# Patient Record
Sex: MALE | Race: WHITE | NOT HISPANIC OR LATINO
[De-identification: names, ages, dates, MRNs, and addresses within clinical notes are randomized per-mention and may not be internally consistent; named-entity substitution may affect disease eponyms.]

---

## 2018-09-21 ENCOUNTER — APPOINTMENT (OUTPATIENT)
Dept: FAMILY MEDICINE | Facility: CLINIC | Age: 23
End: 2018-09-21

## 2019-01-06 ENCOUNTER — APPOINTMENT (OUTPATIENT)
Dept: FAMILY MEDICINE | Facility: CLINIC | Age: 24
End: 2019-01-06
Payer: COMMERCIAL

## 2019-01-06 ENCOUNTER — TRANSCRIPTION ENCOUNTER (OUTPATIENT)
Age: 24
End: 2019-01-06

## 2019-01-06 VITALS
BODY MASS INDEX: 20.46 KG/M2 | OXYGEN SATURATION: 99 % | WEIGHT: 135 LBS | DIASTOLIC BLOOD PRESSURE: 70 MMHG | HEIGHT: 68 IN | HEART RATE: 77 BPM | SYSTOLIC BLOOD PRESSURE: 120 MMHG

## 2019-01-06 PROCEDURE — 99203 OFFICE O/P NEW LOW 30 MIN: CPT

## 2019-01-06 RX ORDER — CHLORHEXIDINE GLUCONATE, 0.12% ORAL RINSE 1.2 MG/ML
0.12 SOLUTION DENTAL
Qty: 473 | Refills: 0 | Status: COMPLETED | COMMUNITY
Start: 2018-12-11

## 2019-01-06 RX ORDER — DEXLANSOPRAZOLE 30 MG/1
30 CAPSULE, DELAYED RELEASE ORAL
Qty: 30 | Refills: 0 | Status: COMPLETED | COMMUNITY
Start: 2018-12-28

## 2019-01-06 RX ORDER — CEFUROXIME AXETIL 250 MG/1
250 TABLET ORAL
Qty: 20 | Refills: 0 | Status: COMPLETED | COMMUNITY
Start: 2018-11-12

## 2019-01-06 RX ORDER — PREDNISONE 20 MG/1
20 TABLET ORAL
Qty: 10 | Refills: 0 | Status: COMPLETED | COMMUNITY
Start: 2018-07-12

## 2019-01-06 RX ORDER — AMOXICILLIN 500 MG/1
500 CAPSULE ORAL
Qty: 20 | Refills: 0 | Status: COMPLETED | COMMUNITY
Start: 2018-09-20

## 2019-01-06 RX ORDER — OMEPRAZOLE 20 MG/1
20 CAPSULE, DELAYED RELEASE ORAL
Qty: 30 | Refills: 0 | Status: COMPLETED | COMMUNITY
Start: 2018-11-13

## 2019-01-06 RX ORDER — AMOXICILLIN AND CLAVULANATE POTASSIUM 875; 125 MG/1; MG/1
875-125 TABLET, COATED ORAL
Qty: 20 | Refills: 0 | Status: COMPLETED | COMMUNITY
Start: 2018-09-20

## 2019-01-13 ENCOUNTER — APPOINTMENT (OUTPATIENT)
Dept: FAMILY MEDICINE | Facility: CLINIC | Age: 24
End: 2019-01-13
Payer: COMMERCIAL

## 2019-01-13 VITALS
SYSTOLIC BLOOD PRESSURE: 110 MMHG | WEIGHT: 135 LBS | BODY MASS INDEX: 20.46 KG/M2 | DIASTOLIC BLOOD PRESSURE: 70 MMHG | HEIGHT: 68 IN

## 2019-01-13 PROCEDURE — 99213 OFFICE O/P EST LOW 20 MIN: CPT

## 2019-01-21 RX ORDER — DOXYCYCLINE 100 MG/1
100 CAPSULE ORAL
Qty: 14 | Refills: 0 | Status: DISCONTINUED | COMMUNITY
Start: 2019-01-20 | End: 2019-01-21

## 2020-09-06 ENCOUNTER — TRANSCRIPTION ENCOUNTER (OUTPATIENT)
Age: 25
End: 2020-09-06

## 2023-05-15 ENCOUNTER — NON-APPOINTMENT (OUTPATIENT)
Age: 28
End: 2023-05-15

## 2023-05-16 ENCOUNTER — APPOINTMENT (OUTPATIENT)
Dept: INTERNAL MEDICINE | Facility: CLINIC | Age: 28
End: 2023-05-16
Payer: COMMERCIAL

## 2023-05-16 ENCOUNTER — NON-APPOINTMENT (OUTPATIENT)
Age: 28
End: 2023-05-16

## 2023-05-16 ENCOUNTER — APPOINTMENT (OUTPATIENT)
Dept: INTERNAL MEDICINE | Facility: CLINIC | Age: 28
End: 2023-05-16

## 2023-05-16 VITALS
DIASTOLIC BLOOD PRESSURE: 77 MMHG | TEMPERATURE: 98 F | BODY MASS INDEX: 21.67 KG/M2 | WEIGHT: 143 LBS | OXYGEN SATURATION: 99 % | SYSTOLIC BLOOD PRESSURE: 123 MMHG | HEIGHT: 68 IN | HEART RATE: 77 BPM

## 2023-05-16 DIAGNOSIS — K58.9 IRRITABLE BOWEL SYNDROME W/OUT DIARRHEA: ICD-10-CM

## 2023-05-16 DIAGNOSIS — R35.0 FREQUENCY OF MICTURITION: ICD-10-CM

## 2023-05-16 DIAGNOSIS — Z00.00 ENCOUNTER FOR GENERAL ADULT MEDICAL EXAMINATION W/OUT ABNORMAL FINDINGS: ICD-10-CM

## 2023-05-16 DIAGNOSIS — J45.909 UNSPECIFIED ASTHMA, UNCOMPLICATED: ICD-10-CM

## 2023-05-16 DIAGNOSIS — H54.7 UNSPECIFIED VISUAL LOSS: ICD-10-CM

## 2023-05-16 DIAGNOSIS — G47.00 INSOMNIA, UNSPECIFIED: ICD-10-CM

## 2023-05-16 DIAGNOSIS — A04.6 ENTERITIS DUE TO YERSINIA ENTEROCOLITICA: ICD-10-CM

## 2023-05-16 DIAGNOSIS — Z82.49 FAMILY HISTORY OF ISCHEMIC HEART DISEASE AND OTHER DISEASES OF THE CIRCULATORY SYSTEM: ICD-10-CM

## 2023-05-16 DIAGNOSIS — F41.9 ANXIETY DISORDER, UNSPECIFIED: ICD-10-CM

## 2023-05-16 DIAGNOSIS — U07.1 COVID-19: ICD-10-CM

## 2023-05-16 DIAGNOSIS — Z83.438 FAMILY HISTORY OF OTHER DISORDER OF LIPOPROTEIN METABOLISM AND OTHER LIPIDEMIA: ICD-10-CM

## 2023-05-16 DIAGNOSIS — Z91.09 OTHER ALLERGY STATUS, OTHER THAN TO DRUGS AND BIOLOGICAL SUBSTANCES: ICD-10-CM

## 2023-05-16 DIAGNOSIS — K21.9 GASTRO-ESOPHAGEAL REFLUX DISEASE W/OUT ESOPHAGITIS: ICD-10-CM

## 2023-05-16 PROCEDURE — 99385 PREV VISIT NEW AGE 18-39: CPT

## 2023-05-16 RX ORDER — DM/PE/ACETAMINOPHEN/CHLORPHENR 10-5-325-2
100 TABLET, SEQUENTIAL ORAL DAILY
Refills: 0 | Status: ACTIVE | COMMUNITY
Start: 2019-01-06

## 2023-05-16 RX ORDER — ZOLPIDEM TARTRATE 5 MG/1
5 TABLET ORAL
Qty: 30 | Refills: 0 | Status: DISCONTINUED | COMMUNITY
Start: 2018-10-31 | End: 2023-05-16

## 2023-05-16 RX ORDER — AMOXICILLIN AND CLAVULANATE POTASSIUM 875; 125 MG/1; MG/1
875-125 TABLET, COATED ORAL
Qty: 14 | Refills: 0 | Status: DISCONTINUED | COMMUNITY
Start: 2019-01-06 | End: 2023-05-16

## 2023-05-16 RX ORDER — SUCRALFATE 1 G/10ML
1 SUSPENSION ORAL
Qty: 1 | Refills: 1 | Status: ACTIVE | COMMUNITY
Start: 1900-01-01 | End: 1900-01-01

## 2023-05-16 RX ORDER — IPRATROPIUM BROMIDE 42 UG/1
0.06 SPRAY NASAL 3 TIMES DAILY
Qty: 1 | Refills: 0 | Status: DISCONTINUED | COMMUNITY
Start: 2019-01-20 | End: 2023-05-16

## 2023-05-16 RX ORDER — MULTIVITAMIN
TABLET ORAL
Refills: 0 | Status: ACTIVE | COMMUNITY

## 2023-05-16 RX ORDER — PEPPERMINT OIL 90 MG
90 CAPSULE, DELAYED, AND EXTENDED RELEASE ORAL
Refills: 0 | Status: ACTIVE | COMMUNITY

## 2023-05-16 RX ORDER — CEFUROXIME AXETIL 500 MG/1
500 TABLET ORAL
Qty: 20 | Refills: 0 | Status: DISCONTINUED | COMMUNITY
Start: 2019-01-21 | End: 2023-05-16

## 2023-05-16 RX ORDER — FAMOTIDINE 20 MG/1
20 TABLET, FILM COATED ORAL
Refills: 0 | Status: ACTIVE | COMMUNITY

## 2023-05-16 RX ORDER — CAMPHOR 0.45 %
25 GEL (GRAM) TOPICAL
Refills: 0 | Status: ACTIVE | COMMUNITY
Start: 2019-01-06

## 2023-05-16 RX ORDER — ECHINACEA 400 MG
CAPSULE ORAL
Refills: 0 | Status: ACTIVE | COMMUNITY

## 2023-05-16 RX ORDER — ASCORBIC ACID 500 MG
TABLET ORAL
Refills: 0 | Status: ACTIVE | COMMUNITY

## 2023-05-16 RX ORDER — PREDNISONE 20 MG/1
20 TABLET ORAL
Qty: 12 | Refills: 0 | Status: DISCONTINUED | COMMUNITY
Start: 2019-01-06 | End: 2023-05-16

## 2023-05-16 RX ORDER — RABEPRAZOLE SODIUM 20 MG/1
20 TABLET, DELAYED RELEASE ORAL
Qty: 30 | Refills: 0 | Status: DISCONTINUED | COMMUNITY
Start: 2018-12-18 | End: 2023-05-16

## 2023-05-16 RX ORDER — AZELASTINE HYDROCHLORIDE 137 UG/1
137 SPRAY, METERED NASAL
Qty: 60 | Refills: 0 | Status: ACTIVE | COMMUNITY
Start: 2017-08-22

## 2023-05-16 RX ORDER — MONTELUKAST 10 MG/1
10 TABLET, FILM COATED ORAL DAILY
Qty: 30 | Refills: 0 | Status: DISCONTINUED | COMMUNITY
Start: 2019-01-13 | End: 2023-05-16

## 2023-05-30 ENCOUNTER — TRANSCRIPTION ENCOUNTER (OUTPATIENT)
Age: 28
End: 2023-05-30

## 2023-05-30 DIAGNOSIS — F13.20 SEDATIVE, HYPNOTIC OR ANXIOLYTIC DEPENDENCE, UNCOMPLICATED: ICD-10-CM

## 2023-06-09 ENCOUNTER — APPOINTMENT (OUTPATIENT)
Dept: ORTHOPEDIC SURGERY | Facility: CLINIC | Age: 28
End: 2023-06-09
Payer: COMMERCIAL

## 2023-06-09 PROCEDURE — 73010 X-RAY EXAM OF SHOULDER BLADE: CPT | Mod: LT

## 2023-06-09 PROCEDURE — 99214 OFFICE O/P EST MOD 30 MIN: CPT

## 2023-06-09 PROCEDURE — 73030 X-RAY EXAM OF SHOULDER: CPT | Mod: LT

## 2023-06-09 RX ORDER — METHYLPREDNISOLONE 4 MG/1
4 TABLET ORAL
Qty: 1 | Refills: 0 | Status: ACTIVE | COMMUNITY
Start: 2023-06-09 | End: 1900-01-01

## 2023-06-09 NOTE — CONSULT LETTER
[Dear  ___] : Dear  [unfilled], [Consult Letter:] : I had the pleasure of evaluating your patient, [unfilled]. [Please see my note below.] : Please see my note below. [Consult Closing:] : Thank you very much for allowing me to participate in the care of this patient.  If you have any questions, please do not hesitate to contact me. [Sincerely,] : Sincerely, [FreeTextEntry3] : Dr. Dmitriy Kelley\par Shoulder Surgery\par

## 2023-06-09 NOTE — ASSESSMENT
[FreeTextEntry1] : We discussed the underlying pathology. \par Treatment options reviewed. \par Start PT.\par Medrol is prescribed. \par Cautions discussed. Questions answered.\par RTO 3 weeks. \par \par The documentation recorded by the scribe accurately reflects the service I personally performed and the decisions made by me.\par Entered by King Chamorro acting as scribe.\par \par Patient seen by Dmitriy Kelley M.D.\par Dr. Dmitriy Kelley\par Shoulder Surgery\par

## 2023-06-09 NOTE — PHYSICAL EXAM
[Left] : left shoulder [Minimal] : minimal [5 ___] : forward flexion 5[unfilled]/5 [5___] : external rotation 5[unfilled]/5 [Right] : right shoulder [Sitting] : sitting [] : no sensory deficits [FreeTextEntry3] : Left trace scapular winging [FreeTextEntry8] : Tender along medial scapula border [FreeTextEntry9] : IR: T12 [TWNoteComboBox4] : passive forward flexion 165 degrees [de-identified] : external rotation 70 degrees

## 2023-06-09 NOTE — HISTORY OF PRESENT ILLNESS
[5] : 5 [1] : 2 [Rest] : rest [Meds] : meds [Ice] : ice [] : no [FreeTextEntry1] : left shoulder  [FreeTextEntry5] : pt has been having left shoulder pain for about a week now, noticed after going to the gym  [FreeTextEntry9] : advil and voltaren  [de-identified] : movement [de-identified] : his PCP

## 2023-06-09 NOTE — REASON FOR VISIT
[FreeTextEntry2] : This is a 28 year old RHD male  with left shoulder pain onset after gym on the 5/22/23. Denies trauma or history. Recalls working out with  and attributes pain to overuse. pushng and pull can be painful. Night symptoms can occur. There is no n/t. NSAID use as needed with temporary relief.\par

## 2023-06-19 ENCOUNTER — APPOINTMENT (OUTPATIENT)
Dept: ORTHOPEDIC SURGERY | Facility: CLINIC | Age: 28
End: 2023-06-19
Payer: COMMERCIAL

## 2023-06-19 PROCEDURE — 99214 OFFICE O/P EST MOD 30 MIN: CPT

## 2023-06-19 RX ORDER — CLONAZEPAM 0.5 MG/1
0.5 TABLET ORAL
Qty: 30 | Refills: 0 | Status: DISCONTINUED | COMMUNITY
Start: 2018-11-06 | End: 2023-01-02

## 2023-06-19 NOTE — HISTORY OF PRESENT ILLNESS
[3] : 3 [0] : 0 [de-identified] : pt is here today for a follow up for his left shoulder. pt states his pain has been improving since last visit  [FreeTextEntry1] : left shoulder  [de-identified] : physical therapy for a few sessions but was not helpful so he stopped\par medrol used and provided some relief

## 2023-06-19 NOTE — REASON FOR VISIT
[FreeTextEntry2] : This is a 28 year old RHD male  with left shoulder pain onset after gym on the 5/22/23. Denies trauma or history. Recalls working out with  and attributes pain to overuse. pushng and pull can be painful. Night symptoms can occur. There is no n/t. NSAID use as needed with temporary relief.  He is feeling better after the medrol, ~85%.  Reaching and lifting are less troublesome.  He is sleeping.  He did 2 PT visits, but stopped because it was more painful.

## 2023-06-19 NOTE — PHYSICAL EXAM
[Left] : left shoulder [Minimal] : minimal [5 ___] : forward flexion 5[unfilled]/5 [5___] : external rotation 5[unfilled]/5 [Right] : right shoulder [Sitting] : sitting [] : no sensory deficits [FreeTextEntry3] : Left trace scapular winging [FreeTextEntry8] : Tender along medial scapula border [FreeTextEntry9] : IR: T12 [TWNoteComboBox4] : passive forward flexion 165 degrees [de-identified] : external rotation 70 degrees

## 2023-06-19 NOTE — ASSESSMENT
[FreeTextEntry1] : We discussed his course. \par He is doing better.\par Aleve use discussed. \par He will follow up in 2 weeks. \par Questions answered.\par \par Patient was seen by Dr. Dmitriy Kelley.\par Patient was seen by Connie COOPER under the supervision of Dr. Dmitriy Kelley.\par Progress note was completed by Connie COOPER.\par Entered by Una Carvajal acting as scribe.

## 2023-07-05 ENCOUNTER — APPOINTMENT (OUTPATIENT)
Dept: ORTHOPEDIC SURGERY | Facility: CLINIC | Age: 28
End: 2023-07-05
Payer: COMMERCIAL

## 2023-07-05 VITALS — WEIGHT: 145 LBS | BODY MASS INDEX: 21.98 KG/M2 | HEIGHT: 68 IN

## 2023-07-05 PROCEDURE — 99214 OFFICE O/P EST MOD 30 MIN: CPT

## 2023-07-05 RX ORDER — METHYLPREDNISOLONE 4 MG/1
4 TABLET ORAL
Qty: 1 | Refills: 0 | Status: ACTIVE | COMMUNITY
Start: 2023-07-05 | End: 1900-01-01

## 2023-07-05 NOTE — REASON FOR VISIT
[FreeTextEntry2] : This is a 28 year old RHD male  with left shoulder pain onset after gym on the 5/22/23. Denies trauma or history. Recalls working out with  and attributes pain to overuse.  There is no n/t. NSAID use as needed with temporary relief.  He is feeling better after the medrol, ~85%.  Reaching and lifting are less troublesome.  He is sleeping.

## 2023-07-05 NOTE — HISTORY OF PRESENT ILLNESS
[3] : 3 [Dull/Aching] : dull/aching [Intermittent] : intermittent [Ice] : ice [Exercising] : exercising [Full time] : Work status: full time [de-identified] : Patient is here for a follow up on his left shoulder. [] : no [FreeTextEntry1] : Left shoulder [de-identified] : p [de-identified] : HEP

## 2023-07-05 NOTE — ASSESSMENT
[FreeTextEntry1] : We discussed his course. \par He has made nice gains. \par He will continue HEP. \par Because there is still discomfort MDP is prescribed. \par Their questions were answered. \par Follow up will be at his request. \par \par Patient was seen by Dr. Dmitriy Kelley.\par Patient was seen by Connie COOPER under the supervision of Dr. Dmitriy Kelley.\par Progress note was completed by Connie COOPER.\par Entered by Una Carvajal acting as scribe.

## 2023-07-05 NOTE — PHYSICAL EXAM
[Left] : left shoulder [Minimal] : minimal [5 ___] : forward flexion 5[unfilled]/5 [5___] : external rotation 5[unfilled]/5 [Right] : right shoulder [Sitting] : sitting [] : no sensory deficits [FreeTextEntry3] : Left trace scapular winging [FreeTextEntry8] : Tender along medial scapula border [FreeTextEntry9] : IR: T12 [TWNoteComboBox4] : passive forward flexion 165 degrees [de-identified] : external rotation 70 degrees

## 2023-10-29 ENCOUNTER — RX RENEWAL (OUTPATIENT)
Age: 28
End: 2023-10-29

## 2023-10-29 RX ORDER — FLUTICASONE PROPIONATE 50 UG/1
50 SPRAY, METERED NASAL
Qty: 48 | Refills: 1 | Status: ACTIVE | COMMUNITY
Start: 2019-01-06 | End: 1900-01-01

## 2024-06-19 ENCOUNTER — APPOINTMENT (OUTPATIENT)
Dept: ORTHOPEDIC SURGERY | Facility: CLINIC | Age: 29
End: 2024-06-19
Payer: COMMERCIAL

## 2024-06-19 VITALS — WEIGHT: 135 LBS | BODY MASS INDEX: 20.46 KG/M2 | HEIGHT: 68 IN

## 2024-06-19 DIAGNOSIS — S43.499A OTHER SPRAIN OF UNSPECIFIED SHOULDER JOINT, INITIAL ENCOUNTER: ICD-10-CM

## 2024-06-19 DIAGNOSIS — G25.89 OTHER SPECIFIED EXTRAPYRAMIDAL AND MOVEMENT DISORDERS: ICD-10-CM

## 2024-06-19 PROCEDURE — 99214 OFFICE O/P EST MOD 30 MIN: CPT

## 2024-06-19 RX ORDER — METHYLPREDNISOLONE 4 MG/1
4 TABLET ORAL
Qty: 1 | Refills: 0 | Status: ACTIVE | COMMUNITY
Start: 2024-06-19 | End: 1900-01-01

## 2024-06-19 NOTE — HISTORY OF PRESENT ILLNESS
[8] : 8 [0] : 0 [Sharp] : sharp [] : yes [Intermittent] : intermittent [Leisure] : leisure [Ice] : ice [Full time] : Work status: full time [de-identified] : Patient is her for a follow up on his left shoulder. [FreeTextEntry1] : Left shoulder [FreeTextEntry7] : to the scapula [de-identified] : prolonged exercise  [de-identified] : works out

## 2024-06-19 NOTE — ASSESSMENT
[FreeTextEntry1] : We discussed the underlying pathology.  Treatment options reviewed.  PT is planned.  MDP is prescribed.  Cautions discussed.  Questions answered.  Patient seen by Dmitriy Kelley M.D. Entered by Una Carvajal acting as scribe.

## 2024-06-19 NOTE — REASON FOR VISIT
[FreeTextEntry2] : This is a 29 year old RHD male  with left shoulder pain onset after overuse at the gym in May 2023.  A MDP helped for a strain around that time.  He had been away for work since summer 2023 and is now back and would like to address the shoulder. There is still discomfort with some exercises. He had taken a MDP with some relief. He uses ice. Sleep is ok.

## 2024-06-19 NOTE — PHYSICAL EXAM
[Left] : left shoulder [Minimal] : minimal [5 ___] : forward flexion 5[unfilled]/5 [5___] : external rotation 5[unfilled]/5 [Right] : right shoulder [Sitting] : sitting [] : no sensory deficits [FreeTextEntry8] : Tender along medial scapula border [FreeTextEntry9] : IR: T12 [TWNoteComboBox4] : passive forward flexion 165 degrees [de-identified] : external rotation 70 degrees

## 2024-07-31 ENCOUNTER — APPOINTMENT (OUTPATIENT)
Dept: ORTHOPEDIC SURGERY | Facility: CLINIC | Age: 29
End: 2024-07-31